# Patient Record
Sex: MALE | Race: WHITE | NOT HISPANIC OR LATINO | Employment: FULL TIME | URBAN - METROPOLITAN AREA
[De-identification: names, ages, dates, MRNs, and addresses within clinical notes are randomized per-mention and may not be internally consistent; named-entity substitution may affect disease eponyms.]

---

## 2024-03-07 ENCOUNTER — OFFICE VISIT (OUTPATIENT)
Dept: ORTHOPEDIC SURGERY | Facility: CLINIC | Age: 39
End: 2024-03-07
Payer: COMMERCIAL

## 2024-03-07 ENCOUNTER — HOSPITAL ENCOUNTER (OUTPATIENT)
Dept: RADIOLOGY | Facility: CLINIC | Age: 39
Discharge: HOME | End: 2024-03-07
Payer: COMMERCIAL

## 2024-03-07 DIAGNOSIS — M79.641 RIGHT HAND PAIN: ICD-10-CM

## 2024-03-07 DIAGNOSIS — M20.011 MALLET DEFORMITY OF RIGHT LITTLE FINGER: Primary | ICD-10-CM

## 2024-03-07 PROCEDURE — 73130 X-RAY EXAM OF HAND: CPT | Mod: RT

## 2024-03-07 PROCEDURE — 99204 OFFICE O/P NEW MOD 45 MIN: CPT | Performed by: ORTHOPAEDIC SURGERY

## 2024-03-07 PROCEDURE — 73130 X-RAY EXAM OF HAND: CPT | Mod: RIGHT SIDE | Performed by: RADIOLOGY

## 2024-03-07 PROCEDURE — 1036F TOBACCO NON-USER: CPT | Performed by: ORTHOPAEDIC SURGERY

## 2024-03-07 NOTE — LETTER
March 30, 2024       No Recipients    Patient: Constantino Dixon   YOB: 1985   Date of Visit: 3/7/2024       Dear Dr. Sellers Recipients:    Thank you for referring Constantino Dixon to me for evaluation. Below are my notes for this consultation.  If you have questions, please do not hesitate to call me. I look forward to following your patient along with you.       Sincerely,     Rudy Ramirez MD      CC:   No Recipients  ______________________________________________________________________________________    CHIEF COMPLAINT         Small finger misaligned    ASSESSMENT + PLAN    Right small finger mallet injury without fracture    The nature of mallet finger injury was reviewed, along with the natural history if left untreated.  The patient was interested in treatment in a Stack splint.  One was provided, and I reviewed proper application.  I also reviewed proper changing technique.     The patient will wear the splint around the clock for the next 6 weeks.  I reviewed that if the splint is off and the finger is bent even once, the clock starts over.  The technique for weaning out of the splint was also reviewed.    Patient will followup with any concerns.        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 Patient states he woke up approximately 1 month ago and noticed the deformity. Denies any incident or trauma that he can remember. States the deformity has been persistent and stable since that time. Mild tenderness to DIP joint.  He notices full passive extension but not active extension at the DIP joint.  Stable to varus and valgus.  No other similar deformities.    He is not diabetic or hypothyroid.  He does not smoke.      REVIEW OF SYSTEMS       A 30-item multi-system Review Of Systems was obtained on today's intake form.  This was reviewed with the patient and is correct.  The  pertinent positives and negatives are listed above.  The form has been scanned separately into the medical record.      PHYSICAL EXAM    Constitutional:    Appears stated age. Well-developed and well-nourished male in no acute distress.  Psychiatric:         Pleasant normal mood and affect. Behavior is appropriate for the situation.   Head:                   Normocephalic and atraumatic.  Eyes:                    Pupils are equal and round.  Cardiovascular:  2+ radial and ulnar pulses. Fingers well-perfused.  Respiratory:        Effort normal. No respiratory distress. Speaking in complete sentences.  Neurologic:       Alert and oriented to person, place, and time.  Skin:                Skin is intact, warm and dry.  Hematologic / Lymphatic:    No lymphedema or lymphangitis.    Extremities / Musculoskeletal:                      Right hand:  DIP joint of small finger resting in slight flexion.  Inability to fully extend DIP joint.  20 degree extensor lag.  Full passive extension.  Full flexion of DIP.  Full flexion/extension of all other digits without any triggering.  Hand warm and well-perfused.  Sensation intact throughout.  Mild tenderness to palpation of DIP joint.  Stable to varus and valgus stress.  Normal fingernail.  No tenderness over the flexor sheath.  Skin intact with no erythema, ecchymosis, or diffuse swelling.      IMAGING / LABS / EMGs           Radiographs of the right hand were ordered and independently interpreted by me today, and demonstrate no acute fracture or dislocation.  Joint spaces are well-maintained.  The lag posture is confirmed.      No past medical history on file.    Medication Documentation Review Audit    **Prior to Admission medications have not yet been reviewed**         Not on File    Social History     Socioeconomic History   • Marital status: Single     Spouse name: Not on file   • Number of children: Not on file   • Years of education: Not on file   • Highest education  level: Not on file   Occupational History   • Not on file   Tobacco Use   • Smoking status: Not on file   • Smokeless tobacco: Not on file   Substance and Sexual Activity   • Alcohol use: Not on file   • Drug use: Not on file   • Sexual activity: Not on file   Other Topics Concern   • Not on file   Social History Narrative   • Not on file     Social Determinants of Health     Financial Resource Strain: Not on file   Food Insecurity: Not on file   Transportation Needs: Not on file   Physical Activity: Not on file   Stress: Not on file   Social Connections: Not on file   Intimate Partner Violence: Not on file   Housing Stability: Not on file       No past surgical history on file.    Gamaliel Nice MD  Orthopaedic Surgery, PGY-2    ATTESTATION    I saw and evaluated the patient. I personally obtained the key and critical portions of the history and physical exam or was physically present for key and critical portions performed by the resident/fellow. I reviewed the resident/fellow's documentation and discussed the patient with the resident/fellow. I agree with the resident/fellow's medical decision making as documented in the note.        Electronically signed  JOAQUÍN Ramirez MD  [phone]

## 2024-03-07 NOTE — PROGRESS NOTES
CHIEF COMPLAINT         Small finger misaligned    ASSESSMENT + PLAN    Right small finger mallet injury without fracture    The nature of mallet finger injury was reviewed, along with the natural history if left untreated.  The patient was interested in treatment in a Stack splint.  One was provided, and I reviewed proper application.  I also reviewed proper changing technique.     The patient will wear the splint around the clock for the next 6 weeks.  I reviewed that if the splint is off and the finger is bent even once, the clock starts over.  The technique for weaning out of the splint was also reviewed.    Patient will followup with any concerns.        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 Patient states he woke up approximately 1 month ago and noticed the deformity. Denies any incident or trauma that he can remember. States the deformity has been persistent and stable since that time. Mild tenderness to DIP joint.  He notices full passive extension but not active extension at the DIP joint.  Stable to varus and valgus.  No other similar deformities.    He is not diabetic or hypothyroid.  He does not smoke.      REVIEW OF SYSTEMS       A 30-item multi-system Review Of Systems was obtained on today's intake form.  This was reviewed with the patient and is correct.  The pertinent positives and negatives are listed above.  The form has been scanned separately into the medical record.      PHYSICAL EXAM    Constitutional:    Appears stated age. Well-developed and well-nourished male in no acute distress.  Psychiatric:         Pleasant normal mood and affect. Behavior is appropriate for the situation.   Head:                   Normocephalic and atraumatic.  Eyes:                    Pupils are equal and round.  Cardiovascular:  2+ radial and ulnar pulses. Fingers well-perfused.  Respiratory:         Effort normal. No respiratory distress. Speaking in complete sentences.  Neurologic:       Alert and oriented to person, place, and time.  Skin:                Skin is intact, warm and dry.  Hematologic / Lymphatic:    No lymphedema or lymphangitis.    Extremities / Musculoskeletal:                      Right hand:  DIP joint of small finger resting in slight flexion.  Inability to fully extend DIP joint.  20 degree extensor lag.  Full passive extension.  Full flexion of DIP.  Full flexion/extension of all other digits without any triggering.  Hand warm and well-perfused.  Sensation intact throughout.  Mild tenderness to palpation of DIP joint.  Stable to varus and valgus stress.  Normal fingernail.  No tenderness over the flexor sheath.  Skin intact with no erythema, ecchymosis, or diffuse swelling.      IMAGING / LABS / EMGs           Radiographs of the right hand were ordered and independently interpreted by me today, and demonstrate no acute fracture or dislocation.  Joint spaces are well-maintained.  The lag posture is confirmed.      No past medical history on file.    Medication Documentation Review Audit    **Prior to Admission medications have not yet been reviewed**         Not on File    Social History     Socioeconomic History    Marital status: Single     Spouse name: Not on file    Number of children: Not on file    Years of education: Not on file    Highest education level: Not on file   Occupational History    Not on file   Tobacco Use    Smoking status: Not on file    Smokeless tobacco: Not on file   Substance and Sexual Activity    Alcohol use: Not on file    Drug use: Not on file    Sexual activity: Not on file   Other Topics Concern    Not on file   Social History Narrative    Not on file     Social Determinants of Health     Financial Resource Strain: Not on file   Food Insecurity: Not on file   Transportation Needs: Not on file   Physical Activity: Not on file   Stress: Not on file    Social Connections: Not on file   Intimate Partner Violence: Not on file   Housing Stability: Not on file       No past surgical history on file.    Gamaliel Nice MD  Orthopaedic Surgery, PGY-2    ATTESTATION    I saw and evaluated the patient. I personally obtained the key and critical portions of the history and physical exam or was physically present for key and critical portions performed by the resident/fellow. I reviewed the resident/fellow's documentation and discussed the patient with the resident/fellow. I agree with the resident/fellow's medical decision making as documented in the note.        Electronically signed  JOAQUÍN Ramirez MD  [phone]     This document is complete and the patient is ready for discharge.

## 2024-08-27 ENCOUNTER — APPOINTMENT (OUTPATIENT)
Dept: PRIMARY CARE | Facility: CLINIC | Age: 39
End: 2024-08-27
Payer: COMMERCIAL